# Patient Record
Sex: MALE | Race: WHITE
[De-identification: names, ages, dates, MRNs, and addresses within clinical notes are randomized per-mention and may not be internally consistent; named-entity substitution may affect disease eponyms.]

---

## 2017-08-31 ENCOUNTER — HOSPITAL ENCOUNTER (EMERGENCY)
Dept: HOSPITAL 59 - ER | Age: 57
Discharge: HOME | End: 2017-08-31
Payer: COMMERCIAL

## 2017-08-31 DIAGNOSIS — R11.0: ICD-10-CM

## 2017-08-31 DIAGNOSIS — Z87.891: ICD-10-CM

## 2017-08-31 DIAGNOSIS — R42: Primary | ICD-10-CM

## 2017-08-31 DIAGNOSIS — R55: ICD-10-CM

## 2017-08-31 DIAGNOSIS — E11.9: ICD-10-CM

## 2017-08-31 DIAGNOSIS — I10: ICD-10-CM

## 2017-08-31 LAB
ALBUMIN SERPL-MCNC: 4.8 GM/DL (ref 3.5–5)
ALBUMIN/GLOB SERPL: 1.7 {RATIO} (ref 1.1–1.8)
ALP SERPL-CCNC: 73 U/L (ref 38–126)
ALT SERPL-CCNC: 38 U/L (ref 21–72)
ANION GAP SERPL CALC-SCNC: 13.6 MMOL/L (ref 7–16)
APTT BLD: 24 SECONDS (ref 24.5–39.1)
AST SERPL-CCNC: 24 U/L (ref 17–59)
BASOPHILS NFR BLD: 0.3 % (ref 0–6)
BILIRUB SERPL-MCNC: 0.56 MG/DL (ref 0.2–1.3)
BUN SERPL-MCNC: 9 MG/DL (ref 9–20)
CO2 SERPL-SCNC: 22.4 MMOL/L (ref 22–30)
CREAT SERPL-MCNC: 0.6 MG/DL (ref 0.66–1.25)
EOSINOPHIL NFR BLD: 0.5 % (ref 0–6)
ERYTHROCYTE [DISTWIDTH] IN BLOOD BY AUTOMATED COUNT: 13.3 % (ref 11.5–14.5)
EST GLOMERULAR FILTRATION RATE: > 60 ML/MIN
GLOBULIN SER-MCNC: 2.9 GM/DL (ref 1.4–4.8)
GLUCOSE SERPL-MCNC: 174 MG/DL (ref 70–110)
GRANULOCYTES NFR BLD: 71.8 % (ref 47–80)
HCT VFR BLD CALC: 43.6 % (ref 42–52)
HGB BLD-MCNC: 14.3 GM/DL (ref 14–18)
INR PPP: 1
LYMPHOCYTES NFR BLD AUTO: 18.1 % (ref 16–45)
MCH RBC QN AUTO: 27.8 PG (ref 27–33)
MCHC RBC AUTO-ENTMCNC: 32.8 G/DL (ref 32–36)
MCV RBC AUTO: 84.8 FL (ref 81–97)
MONOCYTES NFR BLD: 9.3 % (ref 0–9)
PLATELET # BLD: 300 K/UL (ref 130–400)
PMV BLD AUTO: 10.4 FL (ref 7.4–10.4)
PROT SERPL-MCNC: 7.7 GM/DL (ref 6.3–8.2)
PROTHROMBIN TIME: 10.8 SECONDS (ref 9.5–12.1)
RBC # BLD AUTO: 5.14 M/UL (ref 4.4–5.7)
WBC # BLD AUTO: 8 K/UL (ref 4.2–12.2)

## 2017-08-31 PROCEDURE — 99283 EMERGENCY DEPT VISIT LOW MDM: CPT

## 2017-08-31 PROCEDURE — 85730 THROMBOPLASTIN TIME PARTIAL: CPT

## 2017-08-31 PROCEDURE — 70450 CT HEAD/BRAIN W/O DYE: CPT

## 2017-08-31 PROCEDURE — 85610 PROTHROMBIN TIME: CPT

## 2017-08-31 PROCEDURE — 80053 COMPREHEN METABOLIC PANEL: CPT

## 2017-08-31 PROCEDURE — 85025 COMPLETE CBC W/AUTO DIFF WBC: CPT

## 2017-08-31 PROCEDURE — 99284 EMERGENCY DEPT VISIT MOD MDM: CPT

## 2017-08-31 NOTE — EMERGENCY DEPARTMENT RECORD
History of Present Illness





- General


Chief Complaint: Dizziness


Stated Complaint: DIZZY


Time Seen by Provider: 08/31/17 09:41


Source: Patient, Family


Mode of Arrival: Ambulatory


Limitations: No limitations





- History of Present Illness


Initial Comments: 


58 yo male presents with dizziness and room spinning that started yesterday.  

The symptoms were present on waking.  He has spinning with turning of the head.

  The symptoms improve with focusing or holding still.  He has nausea.  No 

vomiting.  No vision changes.  He has sinus pressure.  No weakness, numbness, 

speech changes.  NO swallowing changes.  He has had mild symptoms in the past.  





MD Complaint: Dizziness


-: Hour(s) (1)


Description: "Room spinning"


History of Same: Yes


History of Trauma: No


Severity: Mild


Improves With: Nothing


Worsens With: Position


Associated Symptoms: Other (Nausea)





- Angel Coma Scale


Eye Response: (4) Open spontaneously


Motor Response: (6) Obeys commands


Verbal Response: (5) Oriented


Angel Total: 15





- Related Data


 Previous Rx's











 Medication  Instructions  Recorded


 


Meclizine HCl [Antivert] 25 mg PO Q8H #20 tablet 08/31/17


 


Ondansetron [Zofran Odt] 4 mg PO Q8H #20 tab.rapdis 08/31/17











 Allergies











Allergy/AdvReac Type Severity Reaction Status Date / Time


 


No Known Drug Allergies Allergy   Verified 08/31/17 09:49














Review of Systems


Constitutional: Denies: Chills, Fever, Weakness


Eyes: Denies: Eye discharge, Eye pain, Photophobia, Vision change


ENT: Reports: Ear pain (right side).  Denies: Congestion, Epistaxis, Throat pain


Respiratory: Denies: Cough, Dyspnea, Stridor, Wheezes


Cardiovascular: Denies: Chest pain, Palpitations, Syncope


Endocrine: Denies: Fatigue


Gastrointestinal: Reports: Abdominal pain, Nausea.  Denies: Diarrhea, Vomiting


Genitourinary: Denies: Dysuria, Frequency, Hematuria, Urgency


Musculoskeletal: Denies: Arthralgia, Back pain, Joint swelling, Myalgia, Neck 

pain


Skin: Denies: Change in color, Rash


Neurological: Reports: Headache, Vertigo.  Denies: Confusion, Numbness, 

Paresthesias, Seizure, Tingling, Tremors, Weakness


Psychiatric: Denies: Anxiety


Hematological/Lymphatic: Denies: Blood Clots, Easy bleeding, Easy bruising





Past Medical History





- SOCIAL HISTORY


Smoking Status: Former smoker





- RESPIRATORY


Hx Respiratory Disorders: No





- CARDIOVASCULAR


Hx Cardio Disorders: No





- NEURO


Hx Neuro Disorders: No





- GI


Hx GI Disorders: No





- 


Hx Genitourinary Disorders: Yes


Hx Kidney Stones: Yes (hx)





- ENDOCRINE


Comment:: checks bl sugars daily fbs 100





- MUSCULOSKELETAL


Comment:: pain in right wrist/arm ramírez at night





- PSYCH


Hx Psych Problems: No





- HEMATOLOGY/ONCOLOGY


Hx Hematology/Oncology Disorders: No





Family Medical History


Hx Cancer: Father


*Cancer Comment: prostate


Hx Diabetes: Mother, Grandparents


*Diabetes Comment: maternal grandma





Physical Exam





- General


General Appearance: Alert, Oriented x3, Cooperative, No acute distress


Limitations: No limitations





- Head


Head exam: Normal inspection





- Eye


Eye exam: Normal appearance, PERRL, EOMI, Nystagmus (Nyhstagmus reproduces with 

left gaze).  negative: Conjunctival injection, Periorbital swelling, Scleral 

icterus





- ENT


ENT exam: Normal exam, Mucous membranes moist, Normal external ear exam, Normal 

orophraynx, TM's normal bilaterally


Ear exam: Normal external inspection.  negative: External canal tenderness


Nasal Exam: Normal inspection.  negative: Discharge, Sinus tenderness


Mouth exam: Normal external inspection, Tongue normal





- Neck


Neck exam: Normal inspection, Full ROM.  negative: Tenderness





- Respiratory


Respiratory exam: Normal lung sounds bilaterally.  negative: Respiratory 

distress





- Cardiovascular


Cardiovascular Exam: Regular rate, Normal rhythm, Normal heart sounds


Peripheral Pulses: 2+: Radial (R), Radial (L)





- GI/Abdominal


GI/Abdominal exam: Soft.  negative: Tenderness





- Rectal


Rectal exam: Deferred





- 


 exam: Deferred





- Extremities


Extremities exam: Normal inspection, Full ROM, Normal capillary refill.  

negative: Tenderness





- Back


Back exam: Reports: Normal inspection, Full ROM.  Denies: Muscle spasm, Rash 

noted, Tenderness





- Neurological


Neurological exam: Alert, Normal gait, Oriented X3, Reflexes normal, Other (

Normal FTN, normal DUSTY, ).  negative: Altered, CN II-XII intact, Motor sensory 

deficit





- Psychiatric


Psychiatric exam: Normal affect, Normal mood





- Skin


Skin exam: Dry, Intact, Normal color, Warm





Course





- Reevaluation(s)


Reevaluation #1: 


The patient was seen and examined


Examination is CW peripheral in origin with brief, worse with mvt and resolves 

with focusing, no other symptoms


08/31/17 10:03





Reevaluation #2: 


Radiology called.  The patient has a 1.2.5cm left frontal hypodensity that has 

an age indeterminate appearance and can not rule out subacute issue without 

prior study.


08/31/17 10:47





08/31/17 11:22





Reevaluation #3: 


The patient had an MRI about 5 years ago after a work injury


We have left a message with the PCP for results of that test


The patient is much improved after the medications and can move his head well


08/31/17 11:21





Reevaluation #4: 


I SW Dr Crisostomo the patients PCP.  No old MRI found.  The patient's symptoms 

today are consistent with peripheral vertigo and not a left frontal lobe 

stroke.  He has not demonstrated any frontal lobe symptoms.  Dr Crisostomo 

agrees with ND home with outpatient follow up and out patient MRI.  The patient 

and significant other agree with the plan.  We discussed reasons to return to 

the ED.


08/31/17 11:46








Medical Decision Making





- Lab Data


Result diagrams: 


 08/31/17 10:30





 08/31/17 10:30





Disposition


Disposition: Discharge


Clinical Impression: 


 Dizziness, Vertigo





Disposition: Home, Self-Care


Condition: (1) Good


Instructions:  Vertigo (ED)


Additional Instructions: 


Return immediately if you have any speech, memory, weakness, confusion, 

worsening of any symptoms


Take the Antivert every 6 hours as needed for room spinning dizziness.


Prescriptions: 


Meclizine HCl [Antivert] 25 mg PO Q8H #20 tablet


Ondansetron [Zofran Odt] 4 mg PO Q8H #20 tab.rapdis


Forms:  Patient Portal Access


Time of Disposition: 11:49





Quality





- Quality Measures


Quality Measures: N/A





- Blood Pressure Screening


Does Patient Have Any of the Following: No


Blood Pressure Classification: Hypertensive Reading


Systolic Measurement: 134


Diastolic Measurement: 93


Screening for High Blood Pressure: < Pre-Hypertensive BP, F/U Documented > [

]


Pre-Hypertensive Follow-up Interventions: Referral to alternative/primary care 

provider.

## 2017-08-31 NOTE — CT SCAN REPORT
EXAM:  HEAD CT WITHOUT CONTRAST 



HISTORY:  ACUTE VERTIGO, SYNCOPE, NAUSEA.  



TECHNIQUE:  Contiguous axial images from the cerebral convexities to the 
foramen magnum were obtained without IV contrast.  



Comparison:  None.  



Encounter:  Initial.



Hand dominance:  Right.  



FINDINGS:  Mild generalized atrophy of the brain.  Asymmetric hypodensity 
involving the basal portion of the left frontal lobe anteriorly measuring 2.5 x 
1.0 cm.  No obvious associated volume loss.  No acute intracranial hemorrhage 
or midline shift.   The ventricles, basal cisterns, and sulci are within normal 
limits.  The osseous structures, soft tissues and paranasal sinuses are 
unremarkable.  



IMPRESSION:  

1.  ASYMMETRIC HYPODENSITY INVOLVING THE BASAL PORTION OF THE LEFT FRONTAL LOBE 
MEASURING UP TO 2.5 X 1.0 CM.  DIFFICULT TO CONFIRM ACTUAL PARENCHYMAL LOSS AND 
GLIOSIS.  THE FINDING COULD RELATE TO CHRONIC INFARCT OR PRIOR CLOSED HEAD 
INJURY ALTHOUGH THE POSSIBILITY OF SUBACUTE INFARCT IS NOT EXCLUDED, MRI COULD 
BETTER ASSESS IF WARRANTED.  



2.  FINDINGS WERE PHONED TO DR. CAMACHO IN THE EMERGENCY ROOM ON 8/31/17 AT 10:40 
HOURS.



JOB NUMBER:  [id number]
[facility]

## 2019-02-04 ENCOUNTER — HOSPITAL ENCOUNTER (OUTPATIENT)
Dept: HOSPITAL 59 - HOP | Age: 59
Discharge: HOME | End: 2019-02-04
Attending: INTERNAL MEDICINE
Payer: COMMERCIAL

## 2019-02-04 DIAGNOSIS — E78.00: ICD-10-CM

## 2019-02-04 DIAGNOSIS — Z12.11: Primary | ICD-10-CM

## 2019-02-04 DIAGNOSIS — K63.5: ICD-10-CM

## 2019-02-04 DIAGNOSIS — E11.9: ICD-10-CM

## 2019-02-04 DIAGNOSIS — G25.81: ICD-10-CM

## 2019-02-04 DIAGNOSIS — D12.3: ICD-10-CM

## 2019-02-04 DIAGNOSIS — N28.9: ICD-10-CM

## 2019-02-04 NOTE — OPERATIVE NOTE
DATE OF SURGERY: 02/04/2019 



OPERATION: COLONOSCOPY to the cecum with cold snare polypectomy x2. 



INDICATION: Prior history of colon polyps. The patient returns after 5 years for surveillance. 



ANESTHESIA: Intravenous sedation was administered by the department of anesthesiology and included 
Diprivan titrated to effect. 



PROCEDURE: Following informed consent from this alert individual including a discussion of the risks 
and benefits of the procedure and an opportunity for the patient to ask questions, the patient was 
in the left lateral decubitus position. A digital rectal examination was performed. No abnormalities 
were noted. Following this, the Olympus [id number] video colonoscope was inserted into the rectum 
without resistance. The rectal mucosa had a normal appearance with normal folds and distensibility. 
The colonoscope was advanced up through the bowel to the level of the cecum with some difficulty and 
the bowel seemed to be redundant. Abdominal pressure support was applied by the nursing team. The 
cecum was ultimately reached and found to be unremarkable. There were 2 polyps noted upon initial 
inspection; one in the transverse colon and one in the ascending colon each measuring 4 mm in size 
and each removed with cold snare polypectomy. The transverse colon polyp was recovered; however, at 
the time of this dictation, the ascending colon polyp had yet to be recovered. The colon preparation 
overall was good. From the base of the cecum, the colonoscope was then withdrawn. No additional 
abnormalities were detected upon withdrawal. Retroflexion in the rectum was endoscopically 
unremarkable. The instrument was straightened and removed. The patient tolerated the procedure well 
and was returned to the recovery area in stable condition. 



IMPRESSION: 

1. A 4 mm ascending colon polyp and 4 mm transverse colon polyp removed with cold snare polypectomy 
as described above. 

2. The remainder of the examination was unremarkable. 



RECOMMENDATIONS: Recommendations will be forthcoming pending results of pathology obtained today. 
The patient will be following up with Dr. Toro as well. 



As always, thank you for allowing me to participate in the care of your patient.  CC: ADRIANO TORO D.O.

[place]